# Patient Record
Sex: FEMALE | Race: WHITE | NOT HISPANIC OR LATINO | Employment: OTHER | URBAN - METROPOLITAN AREA
[De-identification: names, ages, dates, MRNs, and addresses within clinical notes are randomized per-mention and may not be internally consistent; named-entity substitution may affect disease eponyms.]

---

## 2022-01-01 ENCOUNTER — MEDICAL CORRESPONDENCE (OUTPATIENT)
Dept: HEALTH INFORMATION MANAGEMENT | Facility: CLINIC | Age: 82
End: 2022-01-01

## 2022-01-01 ENCOUNTER — ONCOLOGY VISIT (OUTPATIENT)
Dept: ONCOLOGY | Facility: CLINIC | Age: 82
End: 2022-01-01
Attending: OBSTETRICS & GYNECOLOGY
Payer: COMMERCIAL

## 2022-01-01 ENCOUNTER — PATIENT OUTREACH (OUTPATIENT)
Dept: ONCOLOGY | Facility: CLINIC | Age: 82
End: 2022-01-01

## 2022-01-01 ENCOUNTER — TRANSCRIBE ORDERS (OUTPATIENT)
Dept: OTHER | Age: 82
End: 2022-01-01

## 2022-01-01 ENCOUNTER — PRE VISIT (OUTPATIENT)
Dept: ONCOLOGY | Facility: CLINIC | Age: 82
End: 2022-01-01

## 2022-01-01 VITALS
OXYGEN SATURATION: 100 % | HEART RATE: 74 BPM | HEIGHT: 61 IN | DIASTOLIC BLOOD PRESSURE: 77 MMHG | BODY MASS INDEX: 37.95 KG/M2 | WEIGHT: 201 LBS | SYSTOLIC BLOOD PRESSURE: 142 MMHG

## 2022-01-01 DIAGNOSIS — C80.1 ADENOCARCINOMA (H): Primary | ICD-10-CM

## 2022-01-01 DIAGNOSIS — N94.89 ADNEXAL MASS: Primary | ICD-10-CM

## 2022-01-01 PROCEDURE — 99205 OFFICE O/P NEW HI 60 MIN: CPT | Performed by: OBSTETRICS & GYNECOLOGY

## 2022-01-01 RX ORDER — INSULIN LISPRO 100 [IU]/ML
INJECTION, SOLUTION INTRAVENOUS; SUBCUTANEOUS
COMMUNITY

## 2022-01-01 RX ORDER — ALLOPURINOL 100 MG/1
200 TABLET ORAL
COMMUNITY
Start: 2022-01-01

## 2022-01-01 RX ORDER — PANTOPRAZOLE SODIUM 40 MG/1
40 TABLET, DELAYED RELEASE ORAL EVERY 12 HOURS
COMMUNITY
Start: 2022-01-01 | End: 2022-01-01

## 2022-01-01 RX ORDER — BLOOD SUGAR DIAGNOSTIC
1 STRIP MISCELLANEOUS
COMMUNITY
Start: 2022-01-01 | End: 2023-07-27

## 2022-01-01 RX ORDER — CARVEDILOL 3.12 MG/1
3.12 TABLET ORAL
COMMUNITY
Start: 2022-01-01 | End: 2022-01-01

## 2022-01-01 RX ORDER — CLOTRIMAZOLE AND BETAMETHASONE DIPROPIONATE 10; .64 MG/G; MG/G
CREAM TOPICAL
COMMUNITY
Start: 2022-01-01

## 2022-01-01 RX ORDER — LOVASTATIN 20 MG
20 TABLET ORAL
COMMUNITY
Start: 2021-10-29

## 2022-01-01 RX ORDER — CALCITRIOL 0.25 UG/1
CAPSULE, LIQUID FILLED ORAL
COMMUNITY
Start: 2022-01-01

## 2022-01-01 RX ORDER — AZELASTINE 1 MG/ML
2 SPRAY, METERED NASAL
COMMUNITY

## 2022-01-01 RX ORDER — KETOROLAC TROMETHAMINE 5 MG/ML
1 SOLUTION OPHTHALMIC 4 TIMES DAILY
COMMUNITY

## 2022-01-01 RX ORDER — INSULIN DEGLUDEC 200 U/ML
INJECTION, SOLUTION SUBCUTANEOUS
COMMUNITY

## 2022-01-01 ASSESSMENT — PAIN SCALES - GENERAL: PAINLEVEL: NO PAIN (0)

## 2022-10-06 NOTE — PROGRESS NOTES
Received call from pt's son Jevon who would like to help arrange her appointment. He is aware of referral and he or one of his siblings will be helping with transportation.  is preferred location.     Hold 10/13 Dr. Denney 1:30 PM @ . Jevon has my contact info and contact for NPS if patient needs to reschedule pending discharge from hospital. Will follow-up as needed.

## 2022-10-11 NOTE — PROGRESS NOTES
RECORDS STATUS - ALL OTHER DIAGNOSIS    Adnexal mass  RECORDS RECEIVED FROM: [facility]   DATE RECEIVED: 10/13/2022    Action    Action Taken 10/11/2022 8:41AM KHOA     I called [facility] Ph: [phone]       NOTES STATUS DETAILS   OFFICE NOTE from referring provider Complete Epic   Margarito Herrera   CLINICAL TRIAL TREATMENTS TO DATE     LABS     PATHOLOGY REPORTS     ANYTHING RELATED TO DIAGNOSIS Complete Labs last updated on 10/6/2022    GENONOMIC TESTING     TYPE:     IMAGING (NEED IMAGES & REPORT)     CT SCANS Complete- [facility] CT Chest Abdomen Pelvis 10/5/2022    MRI Complete - [facility] MRI Pelvis 10/4/2022    MAMMO     ULTRASOUND Complete- [facility] US Pelvic Complete 9/20/2022   PET

## 2022-10-13 PROBLEM — M21.40 PES PLANUS: Status: ACTIVE | Noted: 2022-01-01

## 2022-10-13 PROBLEM — E61.1 IRON DEFICIENCY: Status: ACTIVE | Noted: 2021-11-03

## 2022-10-13 PROBLEM — N25.81 SECONDARY HYPERPARATHYROIDISM (H): Status: ACTIVE | Noted: 2022-01-01

## 2022-10-13 PROBLEM — E11.9 HYPERTENSION COMPLICATING DIABETES (H): Status: ACTIVE | Noted: 2022-01-01

## 2022-10-13 PROBLEM — S72.002A CLOSED DISPLACED FRACTURE OF LEFT FEMORAL NECK (H): Status: ACTIVE | Noted: 2022-01-01

## 2022-10-13 PROBLEM — M17.10 ARTHROSIS OF KNEE: Status: ACTIVE | Noted: 2017-09-18

## 2022-10-13 PROBLEM — E78.5 HYPERLIPIDEMIA ASSOCIATED WITH TYPE 2 DIABETES MELLITUS (H): Status: ACTIVE | Noted: 2022-01-01

## 2022-10-13 PROBLEM — E66.9 OBESITY: Status: ACTIVE | Noted: 2022-01-01

## 2022-10-13 PROBLEM — J45.20 MILD INTERMITTENT ASTHMA: Status: ACTIVE | Noted: 2022-01-01

## 2022-10-13 PROBLEM — D63.1 ANEMIA IN STAGE 3B CHRONIC KIDNEY DISEASE (H): Status: ACTIVE | Noted: 2021-11-03

## 2022-10-13 PROBLEM — I51.7 LAE (LEFT ATRIAL ENLARGEMENT): Status: ACTIVE | Noted: 2022-01-01

## 2022-10-13 PROBLEM — E11.69 HYPERLIPIDEMIA ASSOCIATED WITH TYPE 2 DIABETES MELLITUS (H): Status: ACTIVE | Noted: 2022-01-01

## 2022-10-13 PROBLEM — I50.32 CHRONIC DIASTOLIC HEART FAILURE (H): Status: ACTIVE | Noted: 2021-03-29

## 2022-10-13 PROBLEM — E44.0 PROTEIN-CALORIE MALNUTRITION, MODERATE (H): Status: ACTIVE | Noted: 2022-01-01

## 2022-10-13 PROBLEM — I10 HYPERTENSION COMPLICATING DIABETES (H): Status: ACTIVE | Noted: 2022-01-01

## 2022-10-13 PROBLEM — M42.00 KYPHOSIS DORSALIS JUVENILIS: Status: ACTIVE | Noted: 2022-01-01

## 2022-10-13 PROBLEM — M10.9 GOUT: Status: ACTIVE | Noted: 2022-01-01

## 2022-10-13 PROBLEM — I21.4 NSTEMI (NON-ST ELEVATED MYOCARDIAL INFARCTION) (H): Status: ACTIVE | Noted: 2022-01-01

## 2022-10-13 PROBLEM — R06.02 SOB (SHORTNESS OF BREATH): Status: ACTIVE | Noted: 2022-01-01

## 2022-10-13 PROBLEM — N18.32 ANEMIA IN STAGE 3B CHRONIC KIDNEY DISEASE (H): Status: ACTIVE | Noted: 2021-11-03

## 2022-10-13 NOTE — NURSING NOTE
"Oncology Rooming Note    October 13, 2022 1:40 PM   Consuelo Samuels is a 81 year old female who presents for:    Chief Complaint   Patient presents with     Oncology Clinic Visit     New Patient - Adnexal Mass     Initial Vitals: BP (!) 142/77 (BP Location: Left arm, Patient Position: Chair, Cuff Size: Adult Regular)   Pulse 74   Ht 1.549 m (5' 1\")   Wt 91.2 kg (201 lb)   SpO2 100%   BMI 37.98 kg/m   Estimated body mass index is 37.98 kg/m  as calculated from the following:    Height as of this encounter: 1.549 m (5' 1\").    Weight as of this encounter: 91.2 kg (201 lb). Body surface area is 1.98 meters squared.  No Pain (0) Comment: Data Unavailable   No LMP recorded. Patient is postmenopausal.  Allergies reviewed: Yes  Medications reviewed: Yes    Medications: Medication refills not needed today.  Pharmacy name entered into EPIC: [id number] - [place], [place] - [address]    Clinical concerns: New Patient     Dr. Denney was notified.      Naheed Bliss CMA                "

## 2022-10-13 NOTE — PROGRESS NOTES
Consult Notes on Referred Patient    Date: 10/13/2022       Dr. Margarito Herrera  [facility]  [address] [place] [address]       RE: Consuelo Samuels  : 1940  KARLEE: 10/13/2022    Dear Dr. Margarito Herrera:    I had the pleasure of seeing your patient Consuelo Samuels here at the Gynecologic Cancer Clinic at the [facility] on 10/13/2022.  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  Given these findings she was subsequently sent to the Gynecologic Cancer Clinic for new patient consultation.     SHe has been evaluated over the summer on mulitple occasions for multiple medical problems, but most recently for a single episode of rectal and a single episode of vaginal bleeding.  Neither evaluation resulted in a clear diagnosis.    When the bleedng recurred ( per vagina)  An US was performed which demonstrated:    IMPRESSION:  1. 11 cm mass along the left adnexa difficult to characterize by ultrasound.  Uncertain whether mass could represent exophytic uterine fibroid or ovarian  etiology.  CT with IV contrast recommended for follow-up to help further  characterize this region.  Female pelvic MRI may also be needed.  2. 5 cm hypervascular uterine mass likely representing fibroid.  3. Right ovary not visualized.    This in turn left to a CT of CAP which demon started evidence of lung disease.     10/5/22 CT (CAP  IMPRESSION:   1. Large complicated cystic mass within the left pelvis as described on   patient's recent MRI again most compatible with ovarian neoplasm.   2. Omental metastatic implants as described above with small volume ascites.   3. Multiple bilateral pulmonary nodules raising concern for metastatic disease.   Trace bilateral pleural effusions.    Biopsy:   Final Diagnosis     A. LEFT OMENTUM, BIOPSY   --METASTATIC ADENOCARCINOMA, SEE COMMENT     Electronically  signed by Merrick Lovell MD on 10/10/2022 at  4:22 PM   Comment     The histomorphology and immunophenotype are nonspecific and metastasis from several organ sites is within the differential diagnosis.            Review of Systems:    Systemic           no weight changes; no fever; no chills; no night sweats; no appetite changes  Skin           no rashes, or lesions  Eye           no irritation; no changes in vision  Freddie-Laryngeal           no dysphagia; no hoarseness   Pulmonary    no cough; no shortness of breath  Cardiovascular    no chest pain; no palpitations  Gastrointestinal    no diarrhea; no constipation; no abdominal pain; no changes in bowel  habits; no blood in stool  Genitourinary   no urinary frequency; no urinary urgency; no dysuria; no pain; no abnormal vaginal discharge; no abnormal vaginal bleeding  Breast   no breast discharge; no breast changes; no breast pain  Musculoskeletal    no myalgias; no arthralgias; no back pain  Psychiatric           no depressed mood; no anxiety    Hematologic           no tender lymph nodes; no noticeable swellings or lumps   Endocrine    no hot flashes; no heat/cold intolerance         Neurological   no tremor; no numbness and tingling; no headaches; no difficulty  sleeping      Past Medical History:   Diagnosis Date     15-20 Pk.Yr. Hx. of Cigarette Smoking D/C'd ~1973     Acute congestive heart failure (HCC) 3/06   c bacteremia & acute illness---no recurrence     Adenomatous colon polyp 9/15/2014  - also 2020  Colonoscopy due 9/17     Bilateral Knee Osteoarthritis S/P Right TKA 7/13     Cataract   S/P bilateral removal c IOLs     Chronic fatigue 3/27/2006     Diabetic polyneuropathy associated with type 2 diabetes mellitus (HCC) 12/21/2016     Diverticulosis 11/11/2002     Dyslipidemia     Glaucoma   Rx laser     Gout Dx 1/14/09     History of Chronic Intermittent Iron Deficiency Anemia 8/83     Hypertension     Intermittent asthma     Internal hemorrhoids  11/11/2002     Kyphosis dorsalis juvenilis     LAE (left atrial enlargement)   CARLOS also     Massive Thoracic Chest Wall Hematoma 3/06 3/14/2006     Minimal T6 T11 & T12 Compression Fractures   MRI 4/16/07     Motion sickness   seasick     MSSA Bacteremia 3/06 c Osteomyelitis/Discitis T6 & T7 and Right L5 Pedicle 3/16/06     Nodule 08/18/2004   Flexor tendon nodule right middle finger with some intermittent locking     NSTEMI (non-ST elevated myocardial infarction) ([facility]) 8/16/2022     Obesity, Class III, BMI 40-49.9 (morbid obesity) ([facility])     Osteoporosis 12/21/2016     PAD c Intermittent Claudication S/P Right External Iliac Angioplasty & Stent 9/28/2005     Pes planus     Phlebitis 3/06   Superfical right lower leg     Pulmonary hypertension ([facility]) 08/2000     Pulmonary hypertension ([facility]) 8/1/2000     Sleep apnea 7/22/2009     Stage IV Chronic Kidney Disease c Secondary Hyperparathyroidism   Sees Nephrology     Thoracic Disc Herniations 5/2/2006   Right T9-10 c Cord Impingement; Left T10-11     Type II Diabetes Mellitus c Retinopathy & Peripheral Neuropathy 6/2/2013   Managed by Endocrinology     Vitamin D deficiency     Past Surgical History:   Procedure Laterality Date     ARTHROCENTESIS, ASPIRATION AND/OR INJECTION, MAJOR JOINT OR BURSA W/O US GUIDANCE Right 1/13   Knee steroid injection--Ortho     ARTHROPLASTY, KNEE, CONDYLE & PLATEAU;MEDIAL & LATERAL COMPARTMENTS W/ OR W/OUT PATELLA RESURFACING Right 7/16/13   c computer navigation     BREAST BIOPSY Right 06/03/2003   Intraductal papilloma-central duct excision     CATARACT EXTRACTION W IOL 8/2011   OU.     CHOLECYSTECTOMY; 08/1983     DILATION AND CURETTAGE 1986     HEMIARHTROPLASTY, HIP, PARTIAL Left 7/25/2022   Procedure: Left Hip Hemiarthroplasty; Surgeon: Merrick Giraldo MD; Location: [facility]; Service: Orthopedics     IR ANGIOPLASTY PERIPHERAL EXTREMITY 6/21/12   Right external iliac c stent--Cardiology     SYNVISC Right 6/13/12   Knee--Ortho  "      Family History     Family History   Problem Relation Name Age of Onset     Cancer (other) Father   pancreatic     Other Mother   colitis     Diabetes Mother   Dx 60's     Heart Disease Mother   CHF     Diabetes Brother Billy     Other Brother Billy   Rheumatic Fever, heart murmur     Diabetes Sister Kristen     Hypertension Sister Kristen     Diabetes Brother Alejandro     Obesity Brother Alejandro     Diabetes Sister Rodo     Other Sister Sherrie   Pre diabetes     Hypertension Sister Sherrie     Diabetes Brother Nate     Mental Health Brother Nate   Depression     Heart Disease Brother Roberto   abnormal heart beat     Sleep Disorders Brother Roberto   sleep anea     Diabetes Son Jevon     Mental Health Son Jevon   OCD, Tourettes     Hyperlipidemia Daughter Kymm        Health Maintenance:  Health Maintenance Due   Topic Date Due     DEXA  Never done     ADVANCE CARE PLANNING  Never done     LIPID  Never done     ZOSTER IMMUNIZATION (1 of 2) Never done     DTAP/TDAP/TD IMMUNIZATION (1 - Tdap) 11/16/2002     FALL RISK ASSESSMENT  Never done     PHQ-2 (once per calendar year)  Never done     MEDICARE ANNUAL WELLNESS VISIT  04/23/2022     COVID-19 Vaccine (5 - Booster for Pfizer series) 06/13/2022     INFLUENZA VACCINE (1) Never done         Current Medications:     currently has no medications in their medication list.       Allergies:     Patient has no allergy information on record.        Social History:     Social History     Tobacco Use     Smoking status: Not on file     Smokeless tobacco: Not on file   Substance Use Topics     Alcohol use: Not on file       History   Drug Use Not on file             Physical Exam:     PS 3 (in wheelchair)    VS: BP (!) 142/77 (BP Location: Left arm, Patient Position: Chair, Cuff Size: Adult Regular)   Pulse 74   Ht 1.549 m (5' 1\")   Wt 91.2 kg (201 lb)   SpO2 100%   BMI 37.98 kg/m       General Appearance: healthy and alert, no distress     HEENT:  no thyromegaly, no palpable nodules or " masses     Musculoskeletal: extremities non tender; L>R LE edema (c/w known prior DVT)    Skin: no lesions or rashes     Neurological: normal gait, no gross defects     Psychiatric: appropriate mood and affect                               Hematological: normal cervical, supraclavicular and inguinal lymph nodes     Gastrointestinal:       abdomen soft, non-tender, non-distended, no organomegaly or masses    Genitourinary: defered    Assessment:    Consuelo Samuels is a 81 year old woman with a 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     A total of 60 minutes was spent with the patient, 40 minutes of which were spent in counseling the patient and/or treatment planning.      Plan:     1.)   AdenoCA - WE have discussed the clinical and radiologic findings (likely lung mets) as well as the unclear histology.  Given the pathology findings, CT and  abnormalities - as well as the patients modest to poor overall health (Pulm HTN, CHF, Stage IV renal disease, thrombotic/vascular problems and advanced aged with poor PS) we discussed options from hospice through neoadjuvant chemo with possible debulking.  Primary surgery is precluded by multiple ~1+ cm lung lesions.  Furthermore primary chemo will allow for confirmation of a response prior to consideration of surgery.  She has a tentative plan to start weekly carbo/Taxol in [facility], which is reasonable, but has not committed to completing therapy if it appears intolerable - which is also reasonable.    I have recommended a follow-up CT after three cycles if her CA-125 is falling, after which we can discuss further options.    We have had a gabby discussion, that given her other medical conditions and advanced age that any potential surgical intervention would have to be weighed against the considerable risks of morbidity and mortality, and that she may, in fact, benefit from a less aggressive approach globally, accepting that this might not be optimal  for treatment of her cancer.     2.) Genetic risk factors were assessed and the patient does meet the qualifications for a referral, will defer this to her primary oncology team.      3.) Labs and/or tests ordered include:  none.     4.) Health maintenance issues addressed today include none.      Thank you for allowing us to participate in the care of your patient.         Sincerely,    Khris Denney MD      CC  Patient Care Team:  Ondina Carvajal MD as PCP - General (Family Medicine)  MISAEL PERALES